# Patient Record
(demographics unavailable — no encounter records)

---

## 2024-10-23 NOTE — ASSESSMENT
[FreeTextEntry1] : 1. Seborrheic keratoses  - Discussed nature of these benign lesions  - Related to genetics - these lesions run in families; NOT related to sun exposure - Will treat irritated SK on right lower back with LN2 as it is symptomatic per below   Procedure note: Cryotherapy  Verbal consent obtained. The risks/benefits/alternatives discussed including but not limited to risk of pain, inflammatory and blistering reaction, infection, risk of permanent scar and/or dyspigmentation, recurrence, possible lack of efficacy and need for repeat treatments. Sit confirmed. 1 lesion(s) treated with cryotherapy: liquid nitrogen x2 rapid freeze-slow thaw cycles. Discussed wound care instructions. Patient tolerated well with no immediate complications.  2. Cherry angiomas - Education, counseling provided about these benign lesions  - Reassurance

## 2024-10-23 NOTE — PHYSICAL EXAM
[FreeTextEntry3] : - red vascular growths on the trunk  - waxy stuck-on brown plaque on the right lower back

## 2024-10-23 NOTE — HISTORY OF PRESENT ILLNESS
[FreeTextEntry1] : NPV: growths [de-identified] : MERISSA HANSON is a 78 year old male new patient who presents for evaluation of the followin. Growth on the right lower back, present x years, sometimes gets caught on clothing. PCP recommended eval  2. Other growths on the trunk. asx   No personal or family hx skin CA